# Patient Record
Sex: FEMALE | Race: WHITE | Employment: OTHER | URBAN - METROPOLITAN AREA
[De-identification: names, ages, dates, MRNs, and addresses within clinical notes are randomized per-mention and may not be internally consistent; named-entity substitution may affect disease eponyms.]

---

## 2022-10-12 ENCOUNTER — APPOINTMENT (OUTPATIENT)
Dept: CT IMAGING | Age: 22
End: 2022-10-12
Attending: EMERGENCY MEDICINE
Payer: COMMERCIAL

## 2022-10-12 ENCOUNTER — HOSPITAL ENCOUNTER (EMERGENCY)
Age: 22
Discharge: HOME | End: 2022-10-12
Attending: EMERGENCY MEDICINE
Payer: COMMERCIAL

## 2022-10-12 ENCOUNTER — APPOINTMENT (OUTPATIENT)
Dept: ULTRASOUND IMAGING | Age: 22
End: 2022-10-12
Attending: EMERGENCY MEDICINE
Payer: COMMERCIAL

## 2022-10-12 VITALS
SYSTOLIC BLOOD PRESSURE: 112 MMHG | DIASTOLIC BLOOD PRESSURE: 71 MMHG | HEIGHT: 65 IN | RESPIRATION RATE: 16 BRPM | HEART RATE: 80 BPM | WEIGHT: 148 LBS | BODY MASS INDEX: 24.66 KG/M2 | TEMPERATURE: 97 F | OXYGEN SATURATION: 99 %

## 2022-10-12 DIAGNOSIS — R10.2 CHRONIC PELVIC PAIN IN FEMALE: Primary | ICD-10-CM

## 2022-10-12 DIAGNOSIS — G89.29 CHRONIC PELVIC PAIN IN FEMALE: Primary | ICD-10-CM

## 2022-10-12 LAB
ALBUMIN SERPL-MCNC: 4 G/DL (ref 3.5–5)
ALBUMIN/GLOB SERPL: 1 {RATIO} (ref 1.1–2.2)
ALP SERPL-CCNC: 70 U/L (ref 45–117)
ALT SERPL-CCNC: 14 U/L (ref 12–78)
AMORPH CRY URNS QL MICRO: ABNORMAL
ANION GAP SERPL CALC-SCNC: 7 MMOL/L (ref 5–15)
APPEARANCE UR: ABNORMAL
AST SERPL-CCNC: 10 U/L (ref 15–37)
BACTERIA URNS QL MICRO: NEGATIVE /HPF
BASOPHILS # BLD: 0 K/UL (ref 0–0.1)
BASOPHILS NFR BLD: 0 % (ref 0–1)
BILIRUB SERPL-MCNC: 0.6 MG/DL (ref 0.2–1)
BILIRUB UR QL: NEGATIVE
BUN SERPL-MCNC: 10 MG/DL (ref 6–20)
BUN/CREAT SERPL: 14 (ref 12–20)
CALCIUM SERPL-MCNC: 9.7 MG/DL (ref 8.5–10.1)
CHLORIDE SERPL-SCNC: 106 MMOL/L (ref 97–108)
CO2 SERPL-SCNC: 26 MMOL/L (ref 21–32)
COLOR UR: ABNORMAL
CREAT SERPL-MCNC: 0.73 MG/DL (ref 0.55–1.02)
DIFFERENTIAL METHOD BLD: NORMAL
EOSINOPHIL # BLD: 0.3 K/UL (ref 0–0.4)
EOSINOPHIL NFR BLD: 3 % (ref 0–7)
EPITH CASTS URNS QL MICRO: ABNORMAL /LPF
ERYTHROCYTE [DISTWIDTH] IN BLOOD BY AUTOMATED COUNT: 12.6 % (ref 11.5–14.5)
GLOBULIN SER CALC-MCNC: 3.9 G/DL (ref 2–4)
GLUCOSE SERPL-MCNC: 92 MG/DL (ref 65–100)
GLUCOSE UR STRIP.AUTO-MCNC: NEGATIVE MG/DL
HCG SERPL QL: NEGATIVE
HCT VFR BLD AUTO: 42.4 % (ref 35–47)
HGB BLD-MCNC: 14 G/DL (ref 11.5–16)
HGB UR QL STRIP: ABNORMAL
IMM GRANULOCYTES # BLD AUTO: 0 K/UL (ref 0–0.04)
IMM GRANULOCYTES NFR BLD AUTO: 0 % (ref 0–0.5)
KETONES UR QL STRIP.AUTO: NEGATIVE MG/DL
LEUKOCYTE ESTERASE UR QL STRIP.AUTO: ABNORMAL
LIPASE SERPL-CCNC: 129 U/L (ref 73–393)
LYMPHOCYTES # BLD: 2.1 K/UL (ref 0.8–3.5)
LYMPHOCYTES NFR BLD: 21 % (ref 12–49)
MCH RBC QN AUTO: 28.3 PG (ref 26–34)
MCHC RBC AUTO-ENTMCNC: 33 G/DL (ref 30–36.5)
MCV RBC AUTO: 85.7 FL (ref 80–99)
MONOCYTES # BLD: 0.7 K/UL (ref 0–1)
MONOCYTES NFR BLD: 7 % (ref 5–13)
NEUTS SEG # BLD: 7 K/UL (ref 1.8–8)
NEUTS SEG NFR BLD: 69 % (ref 32–75)
NITRITE UR QL STRIP.AUTO: NEGATIVE
NRBC # BLD: 0 K/UL (ref 0–0.01)
NRBC BLD-RTO: 0 PER 100 WBC
PH UR STRIP: 6.5 [PH] (ref 5–8)
PLATELET # BLD AUTO: 225 K/UL (ref 150–400)
PMV BLD AUTO: 10.3 FL (ref 8.9–12.9)
POTASSIUM SERPL-SCNC: 3.3 MMOL/L (ref 3.5–5.1)
PROT SERPL-MCNC: 7.9 G/DL (ref 6.4–8.2)
PROT UR STRIP-MCNC: ABNORMAL MG/DL
RBC # BLD AUTO: 4.95 M/UL (ref 3.8–5.2)
RBC #/AREA URNS HPF: ABNORMAL /HPF (ref 0–5)
SODIUM SERPL-SCNC: 139 MMOL/L (ref 136–145)
SP GR UR REFRACTOMETRY: 1.01 (ref 1–1.03)
UROBILINOGEN UR QL STRIP.AUTO: 1 EU/DL (ref 0.2–1)
WBC # BLD AUTO: 10.1 K/UL (ref 3.6–11)
WBC URNS QL MICRO: ABNORMAL /HPF (ref 0–4)

## 2022-10-12 PROCEDURE — 36415 COLL VENOUS BLD VENIPUNCTURE: CPT

## 2022-10-12 PROCEDURE — 80053 COMPREHEN METABOLIC PANEL: CPT

## 2022-10-12 PROCEDURE — 84703 CHORIONIC GONADOTROPIN ASSAY: CPT

## 2022-10-12 PROCEDURE — 85025 COMPLETE CBC W/AUTO DIFF WBC: CPT

## 2022-10-12 PROCEDURE — 96374 THER/PROPH/DIAG INJ IV PUSH: CPT

## 2022-10-12 PROCEDURE — 74011000636 HC RX REV CODE- 636

## 2022-10-12 PROCEDURE — 76830 TRANSVAGINAL US NON-OB: CPT

## 2022-10-12 PROCEDURE — 83690 ASSAY OF LIPASE: CPT

## 2022-10-12 PROCEDURE — 81001 URINALYSIS AUTO W/SCOPE: CPT

## 2022-10-12 PROCEDURE — 74011250636 HC RX REV CODE- 250/636: Performed by: EMERGENCY MEDICINE

## 2022-10-12 PROCEDURE — 99285 EMERGENCY DEPT VISIT HI MDM: CPT

## 2022-10-12 PROCEDURE — 74177 CT ABD & PELVIS W/CONTRAST: CPT

## 2022-10-12 RX ORDER — KETOROLAC TROMETHAMINE 30 MG/ML
30 INJECTION, SOLUTION INTRAMUSCULAR; INTRAVENOUS
Status: COMPLETED | OUTPATIENT
Start: 2022-10-12 | End: 2022-10-12

## 2022-10-12 RX ADMIN — IOPAMIDOL 100 ML: 755 INJECTION, SOLUTION INTRAVENOUS at 18:04

## 2022-10-12 RX ADMIN — KETOROLAC TROMETHAMINE 30 MG: 30 INJECTION, SOLUTION INTRAMUSCULAR at 20:51

## 2022-10-12 NOTE — ED PROVIDER NOTES
21F w/ hx asthma p/w 1.5months abd pain. Pt reports 1.5months b/l lower abd pain that is sharp and constant R>L. Went to her OBGYN recently who performed a pelvic exam and sent swabs and was advised to have a pelvic US which hasn't had yet. She reports decreased BM but no bloody or black stools. No F/C, cough, N/V/D. No urinary symptoms, VB/DC or concerns for STIs. No prior abd surgeries. No past medical history on file. No past surgical history on file. No family history on file. Social History     Socioeconomic History    Marital status:      Spouse name: Not on file    Number of children: Not on file    Years of education: Not on file    Highest education level: Not on file   Occupational History    Not on file   Tobacco Use    Smoking status: Not on file    Smokeless tobacco: Not on file   Substance and Sexual Activity    Alcohol use: Not on file    Drug use: Not on file    Sexual activity: Not on file   Other Topics Concern    Not on file   Social History Narrative    Not on file     Social Determinants of Health     Financial Resource Strain: Not on file   Food Insecurity: Not on file   Transportation Needs: Not on file   Physical Activity: Not on file   Stress: Not on file   Social Connections: Not on file   Intimate Partner Violence: Not on file   Housing Stability: Not on file         ALLERGIES: Patient has no known allergies. Review of Systems   Constitutional:  Negative for chills, diaphoresis and fever. HENT:  Negative for facial swelling, mouth sores, nosebleeds, trouble swallowing and voice change. Eyes:  Negative for pain and visual disturbance. Respiratory:  Negative for apnea, cough, choking, shortness of breath, wheezing and stridor. Cardiovascular:  Negative for chest pain, palpitations and leg swelling. Gastrointestinal:  Positive for abdominal pain and constipation. Negative for abdominal distention, blood in stool, diarrhea, nausea and vomiting. Genitourinary:  Positive for pelvic pain. Negative for difficulty urinating, dysuria, flank pain and hematuria. Musculoskeletal:  Negative for joint swelling. Skin:  Negative for color change and rash. Allergic/Immunologic: Negative for immunocompromised state. Neurological:  Negative for dizziness, seizures, syncope, speech difficulty and light-headedness. Hematological:  Does not bruise/bleed easily. Psychiatric/Behavioral:  Negative for agitation and behavioral problems. Vitals:    10/12/22 1515   BP: 112/71   Pulse: 80   Resp: 16   Temp: 97 °F (36.1 °C)   SpO2: 99%   Weight: 67.1 kg (148 lb)   Height: 5' 5\" (1.651 m)            Physical Exam  Vitals and nursing note reviewed. Constitutional:       General: She is not in acute distress. Appearance: Normal appearance. She is not ill-appearing or toxic-appearing. HENT:      Head: Normocephalic and atraumatic. Right Ear: External ear normal.      Left Ear: External ear normal.      Nose: Nose normal.      Mouth/Throat:      Mouth: Mucous membranes are moist.      Pharynx: Oropharynx is clear. No oropharyngeal exudate or posterior oropharyngeal erythema. Eyes:      General: No scleral icterus. Extraocular Movements: Extraocular movements intact. Conjunctiva/sclera: Conjunctivae normal.      Pupils: Pupils are equal, round, and reactive to light. Cardiovascular:      Rate and Rhythm: Normal rate and regular rhythm. Pulses: Normal pulses. Heart sounds: Normal heart sounds. No murmur heard. No friction rub. No gallop. Pulmonary:      Effort: Pulmonary effort is normal. No respiratory distress. Breath sounds: Normal breath sounds. No stridor. No wheezing, rhonchi or rales. Abdominal:      General: There is no distension. Palpations: Abdomen is soft. Tenderness: There is no abdominal tenderness. There is no guarding or rebound. Musculoskeletal:         General: No tenderness or deformity. Normal range of motion. Cervical back: Normal range of motion and neck supple. No rigidity. Right lower leg: No edema. Left lower leg: No edema. Skin:     General: Skin is warm. Capillary Refill: Capillary refill takes less than 2 seconds. Coloration: Skin is not jaundiced. Neurological:      General: No focal deficit present. Mental Status: She is alert. Cranial Nerves: No cranial nerve deficit. Sensory: No sensory deficit. Motor: No weakness. Coordination: Coordination normal.   Psychiatric:         Mood and Affect: Mood normal.         Behavior: Behavior normal.         Thought Content: Thought content normal.         Judgment: Judgment normal.      LABORATORY TESTS:  Admission on 10/12/2022, Discharged on 10/12/2022   Component Date Value Ref Range Status    WBC 10/12/2022 10.1  3.6 - 11.0 K/uL Final    RBC 10/12/2022 4.95  3.80 - 5.20 M/uL Final    HGB 10/12/2022 14.0  11.5 - 16.0 g/dL Final    HCT 10/12/2022 42.4  35.0 - 47.0 % Final    MCV 10/12/2022 85.7  80.0 - 99.0 FL Final    MCH 10/12/2022 28.3  26.0 - 34.0 PG Final    MCHC 10/12/2022 33.0  30.0 - 36.5 g/dL Final    RDW 10/12/2022 12.6  11.5 - 14.5 % Final    PLATELET 47/04/5082 731  150 - 400 K/uL Final    MPV 10/12/2022 10.3  8.9 - 12.9 FL Final    NRBC 10/12/2022 0.0  0  WBC Final    ABSOLUTE NRBC 10/12/2022 0.00  0.00 - 0.01 K/uL Final    NEUTROPHILS 10/12/2022 69  32 - 75 % Final    LYMPHOCYTES 10/12/2022 21  12 - 49 % Final    MONOCYTES 10/12/2022 7  5 - 13 % Final    EOSINOPHILS 10/12/2022 3  0 - 7 % Final    BASOPHILS 10/12/2022 0  0 - 1 % Final    IMMATURE GRANULOCYTES 10/12/2022 0  0.0 - 0.5 % Final    ABS. NEUTROPHILS 10/12/2022 7.0  1.8 - 8.0 K/UL Final    ABS. LYMPHOCYTES 10/12/2022 2.1  0.8 - 3.5 K/UL Final    ABS. MONOCYTES 10/12/2022 0.7  0.0 - 1.0 K/UL Final    ABS. EOSINOPHILS 10/12/2022 0.3  0.0 - 0.4 K/UL Final    ABS. BASOPHILS 10/12/2022 0.0  0.0 - 0.1 K/UL Final    ABS. Ania Dolan. 10/12/2022 0.0  0.00 - 0.04 K/UL Final    DF 10/12/2022 AUTOMATED    Final    Sodium 10/12/2022 139  136 - 145 mmol/L Final    Potassium 10/12/2022 3.3 (A)  3.5 - 5.1 mmol/L Final    Chloride 10/12/2022 106  97 - 108 mmol/L Final    CO2 10/12/2022 26  21 - 32 mmol/L Final    Anion gap 10/12/2022 7  5 - 15 mmol/L Final    Glucose 10/12/2022 92  65 - 100 mg/dL Final    BUN 10/12/2022 10  6 - 20 MG/DL Final    Creatinine 10/12/2022 0.73  0.55 - 1.02 MG/DL Final    BUN/Creatinine ratio 10/12/2022 14  12 - 20   Final    eGFR 10/12/2022 >60  >60 ml/min/1.73m2 Final    Comment:      Pediatric calculator link: [employer]. org/professionals/kdoqi/gfr_calculatorped       Effective Oct 3, 2022       These results are not intended for use in patients <25years of age. eGFR results are calculated without a race factor using  the 2021 CKD-EPI equation. Careful clinical correlation is recommended, particularly when comparing to results calculated using previous equations. The CKD-EPI equation is less accurate in patients with extremes of muscle mass, extra-renal metabolism of creatinine, excessive creatine ingestion, or following therapy that affects renal tubular secretion. Calcium 10/12/2022 9.7  8.5 - 10.1 MG/DL Final    Bilirubin, total 10/12/2022 0.6  0.2 - 1.0 MG/DL Final    ALT (SGPT) 10/12/2022 14  12 - 78 U/L Final    AST (SGOT) 10/12/2022 10 (A)  15 - 37 U/L Final    Alk.  phosphatase 10/12/2022 70  45 - 117 U/L Final    Protein, total 10/12/2022 7.9  6.4 - 8.2 g/dL Final    Albumin 10/12/2022 4.0  3.5 - 5.0 g/dL Final    Globulin 10/12/2022 3.9  2.0 - 4.0 g/dL Final    A-G Ratio 10/12/2022 1.0 (A)  1.1 - 2.2   Final    Lipase 10/12/2022 129  73 - 393 U/L Final    Color 10/12/2022 YELLOW/STRAW    Final    Color Reference Range: Straw, Yellow or Dark Yellow    Appearance 10/12/2022 TURBID (A)  CLEAR   Final    Specific gravity 10/12/2022 1.012  1.003 - 1.030   Final    pH (UA) 10/12/2022 6.5 5.0 - 8.0   Final    Protein 10/12/2022 TRACE (A)  NEG mg/dL Final    Glucose 10/12/2022 Negative  NEG mg/dL Final    Ketone 10/12/2022 Negative  NEG mg/dL Final    Bilirubin 10/12/2022 Negative  NEG   Final    Blood 10/12/2022 LARGE (A)  NEG   Final    Urobilinogen 10/12/2022 1.0  0.2 - 1.0 EU/dL Final    Nitrites 10/12/2022 Negative  NEG   Final    Leukocyte Esterase 10/12/2022 MODERATE (A)  NEG   Final    WBC 10/12/2022 0-4  0 - 4 /hpf Final    RBC 10/12/2022 0-5  0 - 5 /hpf Final    Epithelial cells 10/12/2022 MANY (A)  FEW /lpf Final    Bacteria 10/12/2022 Negative  NEG /hpf Final    Amorphous Crystals 10/12/2022 FEW (A)  NEG   Final    HCG, Ql. 10/12/2022 Negative  NEG   Final    The serum pregnancy test becomes positive at about the time of implantation of the conceptus and approximates a quantitative bHCG value of >5 mIU/ML. IMAGING RESULTS:  CT ABD PELV W CONT   Final Result   No acute process identified      US TRANSVAGINAL   Final Result   IUD in place. No significant abnormalities. MEDICATIONS GIVEN:  Medications   ketorolac (TORADOL) injection 30 mg (30 mg IntraVENous Given 10/12/22 2051)   iopamidoL (ISOVUE-370) 76 % injection (100 mL  Given 10/12/22 1804)       PROGRESS NOTE:   The patient's ED course has been uncomplicated    CONSULTS:  none    IMPRESSION:  1. Chronic pelvic pain in female        PLAN:  - Discharge    Erne Che MD      [facility]  Number of Diagnoses or Management Options  Chronic pelvic pain in female  Diagnosis management comments: 21F w/ hx asthma p/w 1.5months right lower abd pain. Pt nontoxic appearing, afebrile, hemodynamically stable w/o resp distress or hypoxia. Ddx includes appendicitis vs pyelo/UTI vs kidney stone vs acute cholecystitis/choledocholithiasis/cholangitis vs diverticulitis/colitis vs obstruction vs volvulus/intussusception vs incarcerated/strangulated hernia vs pancreatitis vs GYN (ovarian torsion, hemorrhagic cyst, TOA, fibroids). Preg neg.  Benign abd exam. Just had pelvic exam at [facility] office so will defer today. Ordered pelvic US, consider CTAP, labs, UA. Monitor and reassess. 1800 Pt remains stable. Feeling better. Pelvic US neg for acute findings including normal appearing ovaries w/ flow. CTAP neg for acute findings including normal appendix. UA not suggestive of infection. Labs unremarkable. PT has no STI risk factors and declined testing. Advised to follow up w/ gyn for chronic pelvic pain. Patient given specific return precautions and explained signs/symptoms for which to come back to ED immediately but otherwise advised to f/u w/ PCP over next 2-3days.            Amount and/or Complexity of Data Reviewed  Clinical lab tests: ordered and reviewed  Tests in the radiology section of CPT®: ordered and reviewed  Tests in the medicine section of CPT®: ordered and reviewed    Risk of Complications, Morbidity, and/or Mortality  Presenting problems: moderate  Diagnostic procedures: moderate  Management options: moderate           Procedures

## 2022-10-12 NOTE — ED TRIAGE NOTES
Patient reports lower abdominal pain for about a month. She saw her OB last week and they found several ovarian cysts. They talked about doing a ultrasound but it was never scheduled. About 2 weeks ago her left lower abdominal has started to hurt. Her pain is worse today. She does have an IUD in. Her last cycle started on 9/15. No nausea, vomiting, diarrhea. Not a great appetite.

## 2022-10-13 NOTE — ED NOTES
I have reviewed discharge instructions with the patient. The patient verbalized understanding.  Nad noted, ambulated to waiting room independently